# Patient Record
Sex: FEMALE | Race: WHITE | NOT HISPANIC OR LATINO | Employment: PART TIME | URBAN - METROPOLITAN AREA
[De-identification: names, ages, dates, MRNs, and addresses within clinical notes are randomized per-mention and may not be internally consistent; named-entity substitution may affect disease eponyms.]

---

## 2017-01-16 ENCOUNTER — FOLLOW UP (OUTPATIENT)
Dept: URBAN - METROPOLITAN AREA CLINIC 32 | Facility: CLINIC | Age: 52
Setting detail: DERMATOLOGY
End: 2017-01-16

## 2017-01-16 PROBLEM — L40.0 PSORIASIS VULGARIS: Status: ACTIVE | Noted: 2017-01-16

## 2017-01-16 PROBLEM — L40.0 PSORIASIS VULGARIS: Status: RESOLVED | Noted: 2017-01-16

## 2017-01-16 PROCEDURE — 96372 THER/PROPH/DIAG INJ SC/IM: CPT

## 2017-01-16 PROCEDURE — 99213 OFFICE O/P EST LOW 20 MIN: CPT

## 2017-04-10 ENCOUNTER — FOLLOW UP (OUTPATIENT)
Dept: URBAN - METROPOLITAN AREA CLINIC 32 | Facility: CLINIC | Age: 52
Setting detail: DERMATOLOGY
End: 2017-04-10

## 2017-04-10 PROBLEM — L40.0 PSORIASIS VULGARIS: Status: RESOLVED | Noted: 2017-04-10

## 2017-04-10 PROBLEM — L40.0 PSORIASIS VULGARIS: Status: ACTIVE | Noted: 2017-04-10

## 2017-04-10 PROCEDURE — 96372 THER/PROPH/DIAG INJ SC/IM: CPT

## 2017-04-10 PROCEDURE — 99213 OFFICE O/P EST LOW 20 MIN: CPT

## 2017-07-10 ENCOUNTER — FOLLOW UP (OUTPATIENT)
Dept: URBAN - METROPOLITAN AREA CLINIC 32 | Facility: CLINIC | Age: 52
Setting detail: DERMATOLOGY
End: 2017-07-10

## 2017-07-10 PROBLEM — L40.0 PSORIASIS VULGARIS: Status: ACTIVE | Noted: 2017-07-10

## 2017-07-10 PROBLEM — L40.0 PSORIASIS VULGARIS: Status: RESOLVED | Noted: 2017-07-10

## 2017-07-10 PROCEDURE — 96372 THER/PROPH/DIAG INJ SC/IM: CPT

## 2017-09-22 ENCOUNTER — RX ONLY (RX ONLY)
Age: 52
End: 2017-09-22

## 2017-09-22 RX ORDER — USTEKINUMAB 45 MG/.5ML
1 ML INJECTION, SOLUTION SUBCUTANEOUS AS DIRECTED
Qty: 0.5 | Refills: 1
Start: 2017-09-22

## 2017-09-27 ENCOUNTER — RX ONLY (RX ONLY)
Age: 52
End: 2017-09-27

## 2017-09-27 RX ORDER — USTEKINUMAB 45 MG/.5ML
INJECTION, SOLUTION SUBCUTANEOUS AS DIRECTED
Qty: 1 | Refills: 1
Start: 2017-09-27

## 2017-10-24 ENCOUNTER — FOLLOW UP (OUTPATIENT)
Dept: URBAN - METROPOLITAN AREA CLINIC 32 | Facility: CLINIC | Age: 52
Setting detail: DERMATOLOGY
End: 2017-10-24

## 2017-10-24 PROBLEM — L40.0 PSORIASIS VULGARIS: Status: RESOLVED | Noted: 2017-10-24

## 2017-10-24 PROBLEM — L40.0 PSORIASIS VULGARIS: Status: ACTIVE | Noted: 2017-10-24

## 2017-10-24 PROCEDURE — 99213 OFFICE O/P EST LOW 20 MIN: CPT

## 2017-10-24 PROCEDURE — 96372 THER/PROPH/DIAG INJ SC/IM: CPT

## 2017-10-24 RX ORDER — CLOBETASOL PROPIONATE 0.5 MG/G
1 GM AEROSOL, FOAM TOPICAL DAILY
Qty: 50 | Refills: 3
Start: 2017-10-24

## 2018-01-30 ENCOUNTER — RX ONLY (RX ONLY)
Age: 53
End: 2018-01-30

## 2018-01-30 ENCOUNTER — FOLLOW UP (OUTPATIENT)
Dept: URBAN - METROPOLITAN AREA CLINIC 32 | Facility: CLINIC | Age: 53
Setting detail: DERMATOLOGY
End: 2018-01-30

## 2018-01-30 PROBLEM — L40.0 PSORIASIS VULGARIS: Status: RESOLVED | Noted: 2018-01-30

## 2018-01-30 PROBLEM — L40.0 PSORIASIS VULGARIS: Status: ACTIVE | Noted: 2018-01-30

## 2018-01-30 PROCEDURE — 96372 THER/PROPH/DIAG INJ SC/IM: CPT

## 2018-01-30 PROCEDURE — 99213 OFFICE O/P EST LOW 20 MIN: CPT

## 2018-01-30 RX ORDER — FLUOCINOLONE ACETONIDE 0.11 MG/ML
1 DROP OIL AURICULAR (OTIC) QHS
Qty: 20 | Refills: 1
Start: 2018-01-30

## 2018-01-30 RX ORDER — CALCIPOTRIENE AND BETAMETHASONE DIPROPIONATE 50; .5 UG/G; MG/G
1 GM AEROSOL, FOAM TOPICAL BID
Qty: 60 | Refills: 3
Start: 2018-01-30

## 2018-04-10 ENCOUNTER — RX ONLY (RX ONLY)
Age: 53
End: 2018-04-10

## 2018-04-10 RX ORDER — USTEKINUMAB 45 MG/.5ML
INJECTION, SOLUTION SUBCUTANEOUS AS DIRECTED
Qty: 1 | Refills: 0 | Status: DISCONTINUED
Start: 2018-04-10 | End: 2018-05-01

## 2018-05-01 ENCOUNTER — RX ONLY (RX ONLY)
Age: 53
End: 2018-05-01

## 2018-05-01 ENCOUNTER — FOLLOW UP (OUTPATIENT)
Dept: URBAN - METROPOLITAN AREA CLINIC 32 | Facility: CLINIC | Age: 53
Setting detail: DERMATOLOGY
End: 2018-05-01

## 2018-05-01 PROBLEM — L40.0 PSORIASIS VULGARIS: Status: RESOLVED | Noted: 2018-05-01

## 2018-05-01 PROBLEM — L40.0 PSORIASIS VULGARIS: Status: ACTIVE | Noted: 2018-05-01

## 2018-05-01 PROCEDURE — 96372 THER/PROPH/DIAG INJ SC/IM: CPT

## 2018-08-07 ENCOUNTER — INJECTION OTHER (OUTPATIENT)
Dept: URBAN - METROPOLITAN AREA CLINIC 32 | Facility: CLINIC | Age: 53
Setting detail: DERMATOLOGY
End: 2018-08-07

## 2018-08-07 PROBLEM — L40.0 PSORIASIS VULGARIS: Status: ACTIVE | Noted: 2018-08-07

## 2018-08-07 PROBLEM — L40.0 PSORIASIS VULGARIS: Status: RESOLVED | Noted: 2018-08-07

## 2018-08-07 PROCEDURE — 96372 THER/PROPH/DIAG INJ SC/IM: CPT

## 2018-08-07 RX ORDER — CLOBETASOL PROPIONATE 0.5 MG/G
1 APPLICATION AEROSOL, FOAM TOPICAL DAILY
Qty: 50 | Refills: 3 | Status: DISCONTINUED
Start: 2018-08-07 | End: 2018-09-05

## 2018-09-05 ENCOUNTER — RX ONLY (RX ONLY)
Age: 53
End: 2018-09-05

## 2018-09-05 RX ORDER — CLOBETASOL PROPIONATE 0.5 MG/G
1 APPLICATION AEROSOL, FOAM TOPICAL DAILY
Qty: 50 | Refills: 3 | Status: DISCONTINUED
Start: 2018-09-05 | End: 2018-11-06

## 2018-10-17 ENCOUNTER — RX ONLY (RX ONLY)
Age: 53
End: 2018-10-17

## 2018-10-17 RX ORDER — USTEKINUMAB 45 MG/.5ML
INJECTION, SOLUTION SUBCUTANEOUS AS DIRECTED
Qty: 1 | Refills: 1 | Status: DISCONTINUED
Start: 2018-10-17 | End: 2019-04-23

## 2018-11-06 ENCOUNTER — RX ONLY (RX ONLY)
Age: 53
End: 2018-11-06

## 2018-11-06 ENCOUNTER — PSORIASIS (OUTPATIENT)
Dept: URBAN - METROPOLITAN AREA CLINIC 32 | Facility: CLINIC | Age: 53
Setting detail: DERMATOLOGY
End: 2018-11-06

## 2018-11-06 PROBLEM — L40.0 PSORIASIS VULGARIS: Status: ACTIVE | Noted: 2018-11-06

## 2018-11-06 PROBLEM — L40.0 PSORIASIS VULGARIS: Status: RESOLVED | Noted: 2018-11-06

## 2018-11-06 PROCEDURE — 96372 THER/PROPH/DIAG INJ SC/IM: CPT

## 2019-02-04 ENCOUNTER — FOLLOW UP (OUTPATIENT)
Dept: URBAN - METROPOLITAN AREA CLINIC 32 | Facility: CLINIC | Age: 54
Setting detail: DERMATOLOGY
End: 2019-02-04

## 2019-02-04 DIAGNOSIS — B36.0 PITYRIASIS VERSICOLOR: ICD-10-CM

## 2019-02-04 PROBLEM — L40.0 PSORIASIS VULGARIS: Status: ACTIVE | Noted: 2019-02-04

## 2019-02-04 PROBLEM — L40.0 PSORIASIS VULGARIS: Status: RESOLVED | Noted: 2019-02-04

## 2019-02-04 PROCEDURE — 96372 THER/PROPH/DIAG INJ SC/IM: CPT

## 2019-02-04 PROCEDURE — 99213 OFFICE O/P EST LOW 20 MIN: CPT

## 2019-02-06 ENCOUNTER — RX ONLY (RX ONLY)
Age: 54
End: 2019-02-06

## 2019-02-06 RX ORDER — CLOBETASOL PROPIONATE 0.5 MG/G
1 APPLICATION AEROSOL, FOAM TOPICAL DAILY
Qty: 50 | Refills: 3
Start: 2019-02-06

## 2019-04-23 ENCOUNTER — RX ONLY (RX ONLY)
Age: 54
End: 2019-04-23

## 2019-04-23 RX ORDER — USTEKINUMAB 45 MG/.5ML
INJECTION, SOLUTION SUBCUTANEOUS AS DIRECTED
Qty: 1 | Refills: 1
Start: 2019-04-23

## 2019-04-29 ENCOUNTER — INJECTION OTHER (OUTPATIENT)
Dept: URBAN - METROPOLITAN AREA CLINIC 32 | Facility: CLINIC | Age: 54
Setting detail: DERMATOLOGY
End: 2019-04-29

## 2019-04-29 DIAGNOSIS — D18.01 HEMANGIOMA OF SKIN AND SUBCUTANEOUS TISSUE: ICD-10-CM

## 2019-04-29 PROBLEM — L40.0 PSORIASIS VULGARIS: Status: ACTIVE | Noted: 2019-04-29

## 2019-04-29 PROBLEM — L40.0 PSORIASIS VULGARIS: Status: RESOLVED | Noted: 2019-04-29

## 2019-04-29 PROCEDURE — 96372 THER/PROPH/DIAG INJ SC/IM: CPT

## 2019-08-06 ENCOUNTER — CYST INJECTION (OUTPATIENT)
Dept: URBAN - METROPOLITAN AREA CLINIC 32 | Facility: CLINIC | Age: 54
Setting detail: DERMATOLOGY
End: 2019-08-06

## 2019-08-06 DIAGNOSIS — B07.8 OTHER VIRAL WARTS: ICD-10-CM

## 2019-08-06 PROBLEM — L40.0 PSORIASIS VULGARIS: Status: RESOLVED | Noted: 2019-08-06

## 2019-08-06 PROBLEM — L40.0 PSORIASIS VULGARIS: Status: ACTIVE | Noted: 2019-08-06

## 2019-08-06 PROCEDURE — 96372 THER/PROPH/DIAG INJ SC/IM: CPT

## 2019-10-15 ENCOUNTER — RX ONLY (RX ONLY)
Age: 54
End: 2019-10-15

## 2019-10-15 RX ORDER — USTEKINUMAB 45 MG/.5ML
INJECTION, SOLUTION SUBCUTANEOUS AS DIRECTED
Qty: 1 | Refills: 0 | Status: DISCONTINUED
Start: 2019-10-15 | End: 2019-11-05

## 2019-11-05 ENCOUNTER — SKIN CANCER EXAM (OUTPATIENT)
Dept: URBAN - METROPOLITAN AREA CLINIC 32 | Facility: CLINIC | Age: 54
Setting detail: DERMATOLOGY
End: 2019-11-05

## 2019-11-05 ENCOUNTER — RX ONLY (RX ONLY)
Age: 54
End: 2019-11-05

## 2019-11-05 DIAGNOSIS — L70.0 ACNE VULGARIS: ICD-10-CM

## 2019-11-05 DIAGNOSIS — L71.8 OTHER ROSACEA: ICD-10-CM

## 2019-11-05 DIAGNOSIS — L21.8 OTHER SEBORRHEIC DERMATITIS: ICD-10-CM

## 2019-11-05 PROBLEM — L82.1 OTHER SEBORRHEIC KERATOSIS: Status: ACTIVE | Noted: 2019-11-05

## 2019-11-05 PROBLEM — L82.1 OTHER SEBORRHEIC KERATOSIS: Status: RESOLVED | Noted: 2019-11-05

## 2019-11-05 PROBLEM — L40.0 PSORIASIS VULGARIS: Status: RESOLVED | Noted: 2019-11-05

## 2019-11-05 PROBLEM — L40.0 PSORIASIS VULGARIS: Status: ACTIVE | Noted: 2019-11-05

## 2019-11-05 PROCEDURE — 99214 OFFICE O/P EST MOD 30 MIN: CPT

## 2019-11-05 PROCEDURE — 96372 THER/PROPH/DIAG INJ SC/IM: CPT

## 2019-11-05 PROCEDURE — 11900 INJECT SKIN LESIONS </W 7: CPT

## 2020-01-07 ENCOUNTER — RX ONLY (RX ONLY)
Age: 55
End: 2020-01-07

## 2020-01-07 RX ORDER — USTEKINUMAB 45 MG/.5ML
INJECTION, SOLUTION SUBCUTANEOUS AS DIRECTED
Qty: 1 | Refills: 0
Start: 2020-01-07

## 2020-02-11 ENCOUNTER — INJECTION OTHER (OUTPATIENT)
Dept: URBAN - METROPOLITAN AREA CLINIC 32 | Facility: CLINIC | Age: 55
Setting detail: DERMATOLOGY
End: 2020-02-11

## 2020-02-11 DIAGNOSIS — L57.0 ACTINIC KERATOSIS: ICD-10-CM

## 2020-02-11 PROBLEM — L40.0 PSORIASIS VULGARIS: Status: RESOLVED | Noted: 2020-02-11

## 2020-02-11 PROBLEM — L40.0 PSORIASIS VULGARIS: Status: ACTIVE | Noted: 2020-02-11

## 2020-02-11 PROCEDURE — 99212 OFFICE O/P EST SF 10 MIN: CPT

## 2020-02-11 PROCEDURE — 96372 THER/PROPH/DIAG INJ SC/IM: CPT

## 2020-05-13 ENCOUNTER — TELEHEALTH (OUTPATIENT)
Dept: URBAN - METROPOLITAN AREA CLINIC 31 | Facility: CLINIC | Age: 55
Setting detail: DERMATOLOGY
End: 2020-05-13

## 2020-05-13 DIAGNOSIS — Z41.9 ENCOUNTER FOR PROCEDURE FOR PURPOSES OTHER THAN REMEDYING HEALTH STATE, UNSPECIFIED: ICD-10-CM

## 2020-05-13 PROBLEM — L40.0 PSORIASIS VULGARIS: Status: ACTIVE | Noted: 2020-05-13

## 2020-05-13 PROBLEM — L40.0 PSORIASIS VULGARIS: Status: RESOLVED | Noted: 2020-05-13

## 2020-05-13 PROCEDURE — 99213 OFFICE O/P EST LOW 20 MIN: CPT

## 2020-05-27 ENCOUNTER — RX ONLY (RX ONLY)
Age: 55
End: 2020-05-27

## 2020-05-27 RX ORDER — USTEKINUMAB 45 MG/.5ML
INJECTION, SOLUTION SUBCUTANEOUS AS DIRECTED
Qty: 1 | Refills: 1
Start: 2020-05-27

## 2020-06-12 ENCOUNTER — PSORIASIS (OUTPATIENT)
Dept: URBAN - METROPOLITAN AREA CLINIC 32 | Facility: CLINIC | Age: 55
Setting detail: DERMATOLOGY
End: 2020-06-12

## 2020-06-12 DIAGNOSIS — D48.5 NEOPLASM OF UNCERTAIN BEHAVIOR OF SKIN: ICD-10-CM

## 2020-06-12 PROBLEM — L40.0 PSORIASIS VULGARIS: Status: ACTIVE | Noted: 2020-06-12

## 2020-06-12 PROBLEM — L40.0 PSORIASIS VULGARIS: Status: RESOLVED | Noted: 2020-06-12

## 2020-06-12 PROCEDURE — 96372 THER/PROPH/DIAG INJ SC/IM: CPT

## 2020-09-21 ENCOUNTER — PSORIASIS (OUTPATIENT)
Dept: URBAN - METROPOLITAN AREA CLINIC 32 | Facility: CLINIC | Age: 55
Setting detail: DERMATOLOGY
End: 2020-09-21

## 2020-09-21 DIAGNOSIS — L20.84 INTRINSIC (ALLERGIC) ECZEMA: ICD-10-CM

## 2020-09-21 DIAGNOSIS — L56.8 OTHER SPECIFIED ACUTE SKIN CHANGES DUE TO ULTRAVIOLET RADIATION: ICD-10-CM

## 2020-09-21 DIAGNOSIS — D22.5 MELANOCYTIC NEVI OF TRUNK: ICD-10-CM

## 2020-09-21 DIAGNOSIS — L73.8 OTHER SPECIFIED FOLLICULAR DISORDERS: ICD-10-CM

## 2020-09-21 PROBLEM — L40.0 PSORIASIS VULGARIS: Status: ACTIVE | Noted: 2020-09-21

## 2020-09-21 PROBLEM — L40.0 PSORIASIS VULGARIS: Status: RESOLVED | Noted: 2020-09-21

## 2020-09-21 PROCEDURE — 96372 THER/PROPH/DIAG INJ SC/IM: CPT

## 2020-11-24 ENCOUNTER — RX ONLY (RX ONLY)
Age: 55
End: 2020-11-24

## 2020-11-24 RX ORDER — USTEKINUMAB 45 MG/.5ML
INJECTION, SOLUTION SUBCUTANEOUS AS DIRECTED
Qty: 1 | Refills: 0
Start: 2020-11-24

## 2020-12-21 ENCOUNTER — FOLLOW UP (OUTPATIENT)
Dept: URBAN - METROPOLITAN AREA CLINIC 32 | Facility: CLINIC | Age: 55
Setting detail: DERMATOLOGY
End: 2020-12-21

## 2020-12-21 DIAGNOSIS — Z41.9 ENCOUNTER FOR PROCEDURE FOR PURPOSES OTHER THAN REMEDYING HEALTH STATE, UNSPECIFIED: ICD-10-CM

## 2020-12-21 PROBLEM — L40.0 PSORIASIS VULGARIS: Status: RESOLVED | Noted: 2020-12-21

## 2020-12-21 PROBLEM — L40.0 PSORIASIS VULGARIS: Status: ACTIVE | Noted: 2020-12-21

## 2020-12-21 PROCEDURE — 99211 OFF/OP EST MAY X REQ PHY/QHP: CPT

## 2021-03-03 ENCOUNTER — RX ONLY (RX ONLY)
Age: 56
End: 2021-03-03

## 2021-03-03 RX ORDER — USTEKINUMAB 45 MG/.5ML
INJECTION, SOLUTION SUBCUTANEOUS AS DIRECTED
Qty: 1 | Refills: 0
Start: 2021-03-03

## 2021-04-20 ENCOUNTER — FOLLOW UP (OUTPATIENT)
Dept: URBAN - METROPOLITAN AREA CLINIC 32 | Facility: CLINIC | Age: 56
Setting detail: DERMATOLOGY
End: 2021-04-20

## 2021-04-20 DIAGNOSIS — D48.5 NEOPLASM OF UNCERTAIN BEHAVIOR OF SKIN: ICD-10-CM

## 2021-04-20 PROBLEM — L40.0 PSORIASIS VULGARIS: Status: RESOLVED | Noted: 2021-04-20

## 2021-04-20 PROBLEM — L40.0 PSORIASIS VULGARIS: Status: ACTIVE | Noted: 2021-04-20

## 2021-04-20 PROCEDURE — 96372 THER/PROPH/DIAG INJ SC/IM: CPT

## 2021-04-20 PROCEDURE — 99214 OFFICE O/P EST MOD 30 MIN: CPT

## 2021-04-26 ENCOUNTER — RX ONLY (RX ONLY)
Age: 56
End: 2021-04-26

## 2021-04-26 RX ORDER — IXEKIZUMAB 80 MG/ML
1 PEN INJECTOR INJECTION, SOLUTION SUBCUTANEOUS
Qty: 2 | Refills: 2
Start: 2021-04-26

## 2021-05-27 ENCOUNTER — RX ONLY (RX ONLY)
Age: 56
End: 2021-05-27

## 2021-05-27 RX ORDER — IXEKIZUMAB 80 MG/ML
1 PEN INJECTOR INJECTION, SOLUTION SUBCUTANEOUS
Qty: 1 | Refills: 5
Start: 2021-05-27

## 2021-07-12 ENCOUNTER — FOLLOW UP (OUTPATIENT)
Dept: URBAN - METROPOLITAN AREA CLINIC 32 | Facility: CLINIC | Age: 56
Setting detail: DERMATOLOGY
End: 2021-07-12

## 2021-07-12 DIAGNOSIS — L82.0 INFLAMED SEBORRHEIC KERATOSIS: ICD-10-CM

## 2021-07-12 PROBLEM — L40.0 PSORIASIS VULGARIS: Status: RESOLVED | Noted: 2021-07-12

## 2021-07-12 PROBLEM — L40.0 PSORIASIS VULGARIS: Status: ACTIVE | Noted: 2021-07-12

## 2021-07-12 PROCEDURE — 99214 OFFICE O/P EST MOD 30 MIN: CPT

## 2021-11-30 ENCOUNTER — RX ONLY (RX ONLY)
Age: 56
End: 2021-11-30

## 2021-11-30 RX ORDER — IXEKIZUMAB 80 MG/ML
1 PEN INJECTOR INJECTION, SOLUTION SUBCUTANEOUS
Qty: 3 | Refills: 1
Start: 2021-11-30

## 2022-01-10 ENCOUNTER — RX ONLY (RX ONLY)
Age: 57
End: 2022-01-10

## 2022-01-10 ENCOUNTER — FOLLOW UP (OUTPATIENT)
Dept: URBAN - METROPOLITAN AREA CLINIC 32 | Facility: CLINIC | Age: 57
Setting detail: DERMATOLOGY
End: 2022-01-10

## 2022-01-10 DIAGNOSIS — L57.0 ACTINIC KERATOSIS: ICD-10-CM

## 2022-01-10 PROBLEM — L40.0 PSORIASIS VULGARIS: Status: ACTIVE | Noted: 2022-01-10

## 2022-01-10 PROBLEM — L40.0 PSORIASIS VULGARIS: Status: RESOLVED | Noted: 2022-01-10

## 2022-01-10 PROCEDURE — 99214 OFFICE O/P EST MOD 30 MIN: CPT

## 2022-08-08 ENCOUNTER — APPOINTMENT (OUTPATIENT)
Dept: URBAN - METROPOLITAN AREA CLINIC 208 | Age: 57
Setting detail: DERMATOLOGY
End: 2022-08-08

## 2022-08-08 DIAGNOSIS — D22 MELANOCYTIC NEVI: ICD-10-CM

## 2022-08-08 DIAGNOSIS — L40.0 PSORIASIS VULGARIS: ICD-10-CM

## 2022-08-08 DIAGNOSIS — Z71.89 OTHER SPECIFIED COUNSELING: ICD-10-CM

## 2022-08-08 DIAGNOSIS — L29.8 OTHER PRURITUS: ICD-10-CM

## 2022-08-08 DIAGNOSIS — L57.8 OTHER SKIN CHANGES DUE TO CHRONIC EXPOSURE TO NONIONIZING RADIATION: ICD-10-CM

## 2022-08-08 PROBLEM — D22.5 MELANOCYTIC NEVI OF TRUNK: Status: ACTIVE | Noted: 2022-08-08

## 2022-08-08 PROCEDURE — OTHER TALTZ MONITORING: OTHER

## 2022-08-08 PROCEDURE — 99214 OFFICE O/P EST MOD 30 MIN: CPT

## 2022-08-08 PROCEDURE — OTHER COUNSELING: OTHER

## 2022-08-08 PROCEDURE — OTHER PRESCRIPTION MEDICATION MANAGEMENT: OTHER

## 2022-08-08 ASSESSMENT — LOCATION SIMPLE DESCRIPTION DERM
LOCATION SIMPLE: RIGHT UPPER BACK
LOCATION SIMPLE: UPPER BACK
LOCATION SIMPLE: RIGHT SHOULDER

## 2022-08-08 ASSESSMENT — PGA PSORIASIS: PGA PSORIASIS 2020: CLEAR

## 2022-08-08 ASSESSMENT — LOCATION DETAILED DESCRIPTION DERM
LOCATION DETAILED: RIGHT POSTERIOR SHOULDER
LOCATION DETAILED: SUPERIOR THORACIC SPINE
LOCATION DETAILED: RIGHT SUPERIOR UPPER BACK

## 2022-08-08 ASSESSMENT — ITCH INTENSITY: HOW SEVERE IS YOUR ITCHING?: 2

## 2022-08-08 ASSESSMENT — LOCATION ZONE DERM
LOCATION ZONE: TRUNK
LOCATION ZONE: ARM

## 2022-08-08 NOTE — PROCEDURE: PRESCRIPTION MEDICATION MANAGEMENT
Detail Level: Zone
Render In Strict Bullet Format?: No
Continue Regimen: Taltz injection subcutaneously once monthly as prescribed.
Plan: PPD required for additional refills; patient to get lab completed at urgent care.

## 2022-08-08 NOTE — HPI: SKIN CANCER EXAM
What Is The Reason For Today's Visit?: Skin Cancer Exam
What Is The Reason For Today's Visit? (Being Monitored For X): concerning skin lesions on an annual basis
How Severe Are Your Spot(S)?: mild
Additional History: Patient denies any specific concerns at today’s visit.

## 2022-08-08 NOTE — HPI: RASH (PSORIASIS)
Do You Have A Family History Of Psoriasis?: yes
How Severe Is Your Psoriasis?: mild
Is This A New Presentation, Or A Follow-Up?: Follow Up Psoriasis
Additional History: Patient denies any specific concerns at today’s visit.

## 2023-01-27 ENCOUNTER — RX ONLY (RX ONLY)
Age: 58
End: 2023-01-27

## 2023-01-27 RX ORDER — IXEKIZUMAB 80 MG/ML
INJECTION, SOLUTION SUBCUTANEOUS
Qty: 1 | Refills: 5 | Status: ERX | COMMUNITY
Start: 2023-01-27

## 2023-07-19 ENCOUNTER — RX ONLY (RX ONLY)
Age: 58
End: 2023-07-19

## 2023-07-19 RX ORDER — IXEKIZUMAB 80 MG/ML
INJECTION, SOLUTION SUBCUTANEOUS
Qty: 1 | Refills: 0 | Status: ERX

## 2023-08-14 ENCOUNTER — APPOINTMENT (OUTPATIENT)
Dept: URBAN - METROPOLITAN AREA CLINIC 208 | Age: 58
Setting detail: DERMATOLOGY
End: 2023-08-14

## 2023-08-14 DIAGNOSIS — L85.3 XEROSIS CUTIS: ICD-10-CM

## 2023-08-14 DIAGNOSIS — L57.8 OTHER SKIN CHANGES DUE TO CHRONIC EXPOSURE TO NONIONIZING RADIATION: ICD-10-CM

## 2023-08-14 DIAGNOSIS — Z71.89 OTHER SPECIFIED COUNSELING: ICD-10-CM

## 2023-08-14 DIAGNOSIS — D22 MELANOCYTIC NEVI: ICD-10-CM

## 2023-08-14 DIAGNOSIS — L40.0 PSORIASIS VULGARIS: ICD-10-CM

## 2023-08-14 DIAGNOSIS — L82.1 OTHER SEBORRHEIC KERATOSIS: ICD-10-CM

## 2023-08-14 DIAGNOSIS — D18.0 HEMANGIOMA: ICD-10-CM

## 2023-08-14 PROBLEM — D22.5 MELANOCYTIC NEVI OF TRUNK: Status: ACTIVE | Noted: 2023-08-14

## 2023-08-14 PROBLEM — D18.01 HEMANGIOMA OF SKIN AND SUBCUTANEOUS TISSUE: Status: ACTIVE | Noted: 2023-08-14

## 2023-08-14 PROCEDURE — OTHER COUNSELING: OTHER

## 2023-08-14 PROCEDURE — OTHER TALTZ MONITORING: OTHER

## 2023-08-14 PROCEDURE — OTHER PRESCRIPTION MEDICATION MANAGEMENT: OTHER

## 2023-08-14 PROCEDURE — OTHER MIPS QUALITY: OTHER

## 2023-08-14 PROCEDURE — 99214 OFFICE O/P EST MOD 30 MIN: CPT

## 2023-08-14 PROCEDURE — OTHER ADDITIONAL NOTES: OTHER

## 2023-08-14 ASSESSMENT — LOCATION ZONE DERM
LOCATION ZONE: FACE
LOCATION ZONE: FEET
LOCATION ZONE: TRUNK
LOCATION ZONE: ARM

## 2023-08-14 ASSESSMENT — LOCATION SIMPLE DESCRIPTION DERM
LOCATION SIMPLE: RIGHT SHOULDER
LOCATION SIMPLE: LEFT UPPER BACK
LOCATION SIMPLE: LEFT FOREARM
LOCATION SIMPLE: RIGHT UPPER BACK
LOCATION SIMPLE: RIGHT FOREARM
LOCATION SIMPLE: INFERIOR FOREHEAD
LOCATION SIMPLE: CHEST
LOCATION SIMPLE: LEFT PLANTAR SURFACE
LOCATION SIMPLE: RIGHT PLANTAR SURFACE

## 2023-08-14 ASSESSMENT — LOCATION DETAILED DESCRIPTION DERM
LOCATION DETAILED: RIGHT PROXIMAL DORSAL FOREARM
LOCATION DETAILED: UPPER STERNUM
LOCATION DETAILED: LEFT PLANTAR FOREFOOT OVERLYING 3RD METATARSAL
LOCATION DETAILED: INFERIOR MID FOREHEAD
LOCATION DETAILED: MIDDLE STERNUM
LOCATION DETAILED: RIGHT MEDIAL PLANTAR MIDFOOT
LOCATION DETAILED: LEFT MEDIAL UPPER BACK
LOCATION DETAILED: RIGHT SUPERIOR UPPER BACK
LOCATION DETAILED: LEFT PROXIMAL DORSAL FOREARM
LOCATION DETAILED: RIGHT POSTERIOR SHOULDER

## 2023-08-14 NOTE — PROCEDURE: PRESCRIPTION MEDICATION MANAGEMENT
Detail Level: Zone
Render In Strict Bullet Format?: No
Continue Regimen: Taltz injection subcutaneously once monthly as prescribed.
Plan: Patient is due for her TB test in January

## 2023-08-14 NOTE — PROCEDURE: COUNSELING
Detail Level: Detailed
Detail Level: Generalized
Detail Level: Zone
Cleanser Recommendations: CeraVe hydrating bar\\nDove Body Love Dry Cracked Skin replenish body cleanser\\nLa Roche Posay Lipikar Wash gentle foaming moisturizing body & face wash
Moisturizer Recommendations: La Roche Posay Lipikar Triple Repair Moisturizing cream\\nAvène Xeracalm AD Lipid Repleneshing Cream\\nAveeno Eczema Therapy cream\\nVanicream skin cream

## 2023-08-14 NOTE — PROCEDURE: ADDITIONAL NOTES
Additional Notes: Recommended daily moisturizers and baby foot peel on [employer]
Render Risk Assessment In Note?: no
Detail Level: Simple

## 2023-09-05 ENCOUNTER — RX ONLY (RX ONLY)
Age: 58
End: 2023-09-05

## 2023-09-05 RX ORDER — IXEKIZUMAB 80 MG/ML
INJECTION, SOLUTION SUBCUTANEOUS
Qty: 1 | Refills: 3 | Status: ERX

## 2024-01-23 ENCOUNTER — RX ONLY (RX ONLY)
Age: 59
End: 2024-01-23

## 2024-01-23 RX ORDER — IXEKIZUMAB 80 MG/ML
INJECTION, SOLUTION SUBCUTANEOUS
Qty: 1 | Refills: 6 | Status: ERX

## 2024-08-16 ENCOUNTER — APPOINTMENT (OUTPATIENT)
Dept: URBAN - METROPOLITAN AREA CLINIC 208 | Age: 59
Setting detail: DERMATOLOGY
End: 2024-08-24

## 2024-08-16 VITALS — WEIGHT: 164 LBS

## 2024-08-16 DIAGNOSIS — L81.4 OTHER MELANIN HYPERPIGMENTATION: ICD-10-CM

## 2024-08-16 DIAGNOSIS — D22 MELANOCYTIC NEVI: ICD-10-CM

## 2024-08-16 DIAGNOSIS — Z71.89 OTHER SPECIFIED COUNSELING: ICD-10-CM

## 2024-08-16 DIAGNOSIS — L57.8 OTHER SKIN CHANGES DUE TO CHRONIC EXPOSURE TO NONIONIZING RADIATION: ICD-10-CM

## 2024-08-16 DIAGNOSIS — L40.0 PSORIASIS VULGARIS: ICD-10-CM

## 2024-08-16 DIAGNOSIS — D18.0 HEMANGIOMA: ICD-10-CM

## 2024-08-16 DIAGNOSIS — L82.1 OTHER SEBORRHEIC KERATOSIS: ICD-10-CM

## 2024-08-16 DIAGNOSIS — H00.01 HORDEOLUM EXTERNUM: ICD-10-CM

## 2024-08-16 PROBLEM — H00.015 HORDEOLUM EXTERNUM LEFT LOWER EYELID: Status: ACTIVE | Noted: 2024-08-16

## 2024-08-16 PROBLEM — D18.01 HEMANGIOMA OF SKIN AND SUBCUTANEOUS TISSUE: Status: ACTIVE | Noted: 2024-08-16

## 2024-08-16 PROBLEM — D22.5 MELANOCYTIC NEVI OF TRUNK: Status: ACTIVE | Noted: 2024-08-16

## 2024-08-16 PROCEDURE — OTHER SUNSCREEN RECOMMENDATIONS: OTHER

## 2024-08-16 PROCEDURE — OTHER PATIENT SPECIFIC COUNSELING: OTHER

## 2024-08-16 PROCEDURE — OTHER COUNSELING: OTHER

## 2024-08-16 PROCEDURE — OTHER TALTZ MONITORING: OTHER

## 2024-08-16 PROCEDURE — OTHER PRESCRIPTION MEDICATION MANAGEMENT: OTHER

## 2024-08-16 PROCEDURE — OTHER OTHER: OTHER

## 2024-08-16 PROCEDURE — 99214 OFFICE O/P EST MOD 30 MIN: CPT

## 2024-08-16 PROCEDURE — OTHER PRESCRIPTION: OTHER

## 2024-08-16 PROCEDURE — OTHER MIPS QUALITY: OTHER

## 2024-08-16 RX ORDER — IXEKIZUMAB 80 MG/ML
INJECTION, SOLUTION SUBCUTANEOUS
Qty: 1 | Refills: 5 | Status: ERX | COMMUNITY
Start: 2024-08-16

## 2024-08-16 ASSESSMENT — LOCATION SIMPLE DESCRIPTION DERM
LOCATION SIMPLE: RIGHT FOREARM
LOCATION SIMPLE: LEFT CHEEK
LOCATION SIMPLE: LEFT FOREARM
LOCATION SIMPLE: LEFT INFERIOR EYELID
LOCATION SIMPLE: RIGHT CHEEK
LOCATION SIMPLE: UPPER BACK
LOCATION SIMPLE: LEFT ANTERIOR NECK
LOCATION SIMPLE: ABDOMEN
LOCATION SIMPLE: TRAPEZIAL NECK
LOCATION SIMPLE: LOWER BACK
LOCATION SIMPLE: CHEST

## 2024-08-16 ASSESSMENT — LOCATION DETAILED DESCRIPTION DERM
LOCATION DETAILED: RIGHT CENTRAL MALAR CHEEK
LOCATION DETAILED: LEFT INFERIOR MEDIAL MALAR CHEEK
LOCATION DETAILED: LEFT INFERIOR ANTERIOR NECK
LOCATION DETAILED: SUPERIOR THORACIC SPINE
LOCATION DETAILED: EPIGASTRIC SKIN
LOCATION DETAILED: MID TRAPEZIAL NECK
LOCATION DETAILED: LEFT PROXIMAL DORSAL FOREARM
LOCATION DETAILED: LEFT LATERAL INFERIOR EYELID
LOCATION DETAILED: RIGHT PROXIMAL DORSAL FOREARM
LOCATION DETAILED: SUPERIOR LUMBAR SPINE
LOCATION DETAILED: MIDDLE STERNUM
LOCATION DETAILED: INFERIOR THORACIC SPINE

## 2024-08-16 ASSESSMENT — LOCATION ZONE DERM
LOCATION ZONE: TRUNK
LOCATION ZONE: NECK
LOCATION ZONE: FACE
LOCATION ZONE: ARM
LOCATION ZONE: EYELID

## 2024-08-16 NOTE — PROCEDURE: COUNSELING
Detail Level: Generalized
Sunscreen Recommendations: For the face:\\nEltaMD\\nRevision\\nColor Science\\nSupergoop Unseen\\nLa Roche Posay\\nCeraVe Ultralight\\n\\nFor the body:\\nElta Sport\\nLa Roche Posay\\nNeutrogena\\nCoppertone\\n
Detail Level: Zone
Cleansers Recommendations: Cetaphil or CeraVe Cleanser
Moisturizers Recommendations: Cetaphil or CeraVe
Quality 410: Psoriasis Clinical Response To Oral Systemic Or Biologic Medications: Psoriasis Assessment Tool Documented, Met Specified Benchmark
Quality 176: Tuberculosis Screening Prior To First Course Of Biologic And/Or Immune Response Modifier Therapy: Patient receiving first-time biologic and/or immune response modifier therapy, TB Screening Performed and Results Interpreted within 12 months
Detail Level: Detailed

## 2024-08-16 NOTE — PROCEDURE: PRESCRIPTION MEDICATION MANAGEMENT
Detail Level: Zone
Plan: - Patient is due for her annual TB test in January, as her last TB test expires on 01/11/2025.\\n- Patient admits to joint stiffness in the morning.\\n- Task generated to
Continue Regimen: Taltz injection subcutaneously once monthly as prescribed.
Render In Strict Bullet Format?: No

## 2024-08-16 NOTE — HPI: FULL BODY SKIN EXAMINATION
How Severe Are Your Spot(S)?: moderate
What Type Of Note Output Would You Prefer (Optional)?: Standard Output
What Is The Reason For Today's Visit?: Full Body Skin Examination with No Concerns
What Is The Reason For Today's Visit? (Being Monitored For X): concerning skin lesions on an annual basis
Additional History: She denies any moles or skin lesions of concern. Additionally, she needs to renew her Taltz prescription for psoriasis.

## 2024-08-16 NOTE — PROCEDURE: OTHER
Other (Free Text): Baseline BSA: 6% (scalp and perianal)\\nCurrent BSA: 0% (well controlled with Taltz)\\nPGA: \\nWeight: 164lbs\\nDate of Last TB Test:\\nPsoriatic Arthritis: \\nPrevious Biologic Use:\\n\\n\\n* The above mentioned Overall Assessment is the ISGA
Detail Level: Zone
Note Text (......Xxx Chief Complaint.): This diagnosis correlates with the
Render Risk Assessment In Note?: no

## 2025-02-17 ENCOUNTER — APPOINTMENT (OUTPATIENT)
Dept: URBAN - METROPOLITAN AREA CLINIC 208 | Age: 60
Setting detail: DERMATOLOGY
End: 2025-03-02

## 2025-02-17 DIAGNOSIS — L40.0 PSORIASIS VULGARIS: ICD-10-CM

## 2025-02-17 DIAGNOSIS — L21.8 OTHER SEBORRHEIC DERMATITIS: ICD-10-CM

## 2025-02-17 PROCEDURE — OTHER PRESCRIPTION: OTHER

## 2025-02-17 PROCEDURE — OTHER COUNSELING: OTHER

## 2025-02-17 PROCEDURE — OTHER MIPS QUALITY: OTHER

## 2025-02-17 PROCEDURE — 99214 OFFICE O/P EST MOD 30 MIN: CPT

## 2025-02-17 RX ORDER — MOMETASONE FUROATE 1 MG/G
OINTMENT TOPICAL
Qty: 15 | Refills: 2 | Status: ERX | COMMUNITY
Start: 2025-02-17

## 2025-02-17 ASSESSMENT — LOCATION SIMPLE DESCRIPTION DERM: LOCATION SIMPLE: LEFT UPPER BACK

## 2025-02-17 ASSESSMENT — ITCH NUMERIC RATING SCALE: HOW SEVERE IS YOUR ITCHING?: 0

## 2025-02-17 ASSESSMENT — LOCATION ZONE DERM: LOCATION ZONE: TRUNK

## 2025-02-17 ASSESSMENT — BSA PSORIASIS: % BODY COVERED IN PSORIASIS: 0

## 2025-02-17 ASSESSMENT — LOCATION DETAILED DESCRIPTION DERM: LOCATION DETAILED: LEFT SUPERIOR MEDIAL UPPER BACK

## 2025-02-17 NOTE — HPI: MEDICATION (TALTZ)
How Severe Is It?: moderate
Is This A New Presentation, Or A Follow-Up?: Follow Up Taltz
Additional History: Patient is clear and plans to get TB test this week

## 2025-03-26 RX ORDER — IXEKIZUMAB 80 MG/ML
INJECTION, SOLUTION SUBCUTANEOUS
Qty: 1 | Refills: 5 | Status: ERX

## 2025-08-18 ENCOUNTER — APPOINTMENT (OUTPATIENT)
Dept: URBAN - METROPOLITAN AREA CLINIC 208 | Age: 60
Setting detail: DERMATOLOGY
End: 2025-09-01

## 2025-08-18 DIAGNOSIS — L81.4 OTHER MELANIN HYPERPIGMENTATION: ICD-10-CM

## 2025-08-18 DIAGNOSIS — D18.0 HEMANGIOMA: ICD-10-CM

## 2025-08-18 DIAGNOSIS — L57.8 OTHER SKIN CHANGES DUE TO CHRONIC EXPOSURE TO NONIONIZING RADIATION: ICD-10-CM

## 2025-08-18 DIAGNOSIS — L40.0 PSORIASIS VULGARIS: ICD-10-CM

## 2025-08-18 DIAGNOSIS — L82.1 OTHER SEBORRHEIC KERATOSIS: ICD-10-CM

## 2025-08-18 DIAGNOSIS — Z71.89 OTHER SPECIFIED COUNSELING: ICD-10-CM

## 2025-08-18 DIAGNOSIS — D22 MELANOCYTIC NEVI: ICD-10-CM

## 2025-08-18 PROBLEM — D18.01 HEMANGIOMA OF SKIN AND SUBCUTANEOUS TISSUE: Status: ACTIVE | Noted: 2025-08-18

## 2025-08-18 PROBLEM — D22.5 MELANOCYTIC NEVI OF TRUNK: Status: ACTIVE | Noted: 2025-08-18

## 2025-08-18 PROCEDURE — OTHER ADDITIONAL NOTES: OTHER

## 2025-08-18 PROCEDURE — OTHER PATIENT SPECIFIC COUNSELING: OTHER

## 2025-08-18 PROCEDURE — OTHER SUNSCREEN RECOMMENDATIONS: OTHER

## 2025-08-18 PROCEDURE — OTHER MIPS QUALITY: OTHER

## 2025-08-18 PROCEDURE — OTHER PRESCRIPTION: OTHER

## 2025-08-18 PROCEDURE — OTHER COUNSELING: OTHER

## 2025-08-18 PROCEDURE — 99214 OFFICE O/P EST MOD 30 MIN: CPT

## 2025-08-18 RX ORDER — MOMETASONE FUROATE 1 MG/G
OINTMENT TOPICAL
Qty: 15 | Refills: 2 | Status: ERX | COMMUNITY
Start: 2025-08-18

## 2025-08-18 RX ORDER — IXEKIZUMAB 80 MG/ML
INJECTION, SOLUTION SUBCUTANEOUS
Qty: 1 | Refills: 5 | Status: ERX | COMMUNITY
Start: 2025-08-18

## 2025-08-18 ASSESSMENT — LOCATION DETAILED DESCRIPTION DERM
LOCATION DETAILED: RIGHT CENTRAL MALAR CHEEK
LOCATION DETAILED: RIGHT PROXIMAL DORSAL FOREARM
LOCATION DETAILED: LEFT INFERIOR ANTERIOR NECK
LOCATION DETAILED: SUPERIOR THORACIC SPINE
LOCATION DETAILED: LEFT PROXIMAL DORSAL FOREARM
LOCATION DETAILED: MID TRAPEZIAL NECK
LOCATION DETAILED: LEFT SUPERIOR MEDIAL UPPER BACK
LOCATION DETAILED: LEFT INFERIOR MEDIAL MALAR CHEEK
LOCATION DETAILED: SUPERIOR LUMBAR SPINE
LOCATION DETAILED: MIDDLE STERNUM
LOCATION DETAILED: INFERIOR THORACIC SPINE
LOCATION DETAILED: EPIGASTRIC SKIN

## 2025-08-18 ASSESSMENT — LOCATION SIMPLE DESCRIPTION DERM
LOCATION SIMPLE: LOWER BACK
LOCATION SIMPLE: LEFT CHEEK
LOCATION SIMPLE: CHEST
LOCATION SIMPLE: LEFT FOREARM
LOCATION SIMPLE: LEFT ANTERIOR NECK
LOCATION SIMPLE: RIGHT CHEEK
LOCATION SIMPLE: LEFT UPPER BACK
LOCATION SIMPLE: RIGHT FOREARM
LOCATION SIMPLE: UPPER BACK
LOCATION SIMPLE: TRAPEZIAL NECK
LOCATION SIMPLE: ABDOMEN

## 2025-08-18 ASSESSMENT — LOCATION ZONE DERM
LOCATION ZONE: ARM
LOCATION ZONE: NECK
LOCATION ZONE: TRUNK
LOCATION ZONE: FACE

## 2025-08-18 ASSESSMENT — BSA PSORIASIS: % BODY COVERED IN PSORIASIS: 0

## 2025-08-18 ASSESSMENT — ITCH NUMERIC RATING SCALE: HOW SEVERE IS YOUR ITCHING?: 0
